# Patient Record
Sex: FEMALE | URBAN - METROPOLITAN AREA
[De-identification: names, ages, dates, MRNs, and addresses within clinical notes are randomized per-mention and may not be internally consistent; named-entity substitution may affect disease eponyms.]

---

## 2021-02-08 ENCOUNTER — OFFICE VISIT (OUTPATIENT)
Dept: URBAN - METROPOLITAN AREA CLINIC 40 | Facility: CLINIC | Age: 61
End: 2021-02-08
Payer: COMMERCIAL

## 2021-02-08 DIAGNOSIS — H52.223 REGULAR ASTIGMATISM, BILATERAL: Primary | ICD-10-CM

## 2021-02-08 PROCEDURE — 92004 COMPRE OPH EXAM NEW PT 1/>: CPT | Performed by: OPTOMETRIST

## 2021-02-08 ASSESSMENT — INTRAOCULAR PRESSURE
OD: 13
OS: 13

## 2021-02-08 ASSESSMENT — VISUAL ACUITY
OD: 20/20
OS: 20/20

## 2021-02-08 ASSESSMENT — KERATOMETRY
OD: 46.00
OS: 45.75

## 2022-07-27 ENCOUNTER — OFFICE VISIT (OUTPATIENT)
Dept: URBAN - METROPOLITAN AREA CLINIC 40 | Facility: CLINIC | Age: 62
End: 2022-07-27
Payer: COMMERCIAL

## 2022-07-27 DIAGNOSIS — H52.4 PRESBYOPIA: ICD-10-CM

## 2022-07-27 DIAGNOSIS — H52.223 REGULAR ASTIGMATISM, BILATERAL: Primary | ICD-10-CM

## 2022-07-27 PROCEDURE — 92014 COMPRE OPH EXAM EST PT 1/>: CPT | Performed by: OPTOMETRIST

## 2022-07-27 ASSESSMENT — VISUAL ACUITY
OS: 20/20
OD: 20/20

## 2022-07-27 ASSESSMENT — INTRAOCULAR PRESSURE
OD: 13
OS: 14

## 2022-07-27 NOTE — IMPRESSION/PLAN
Impression: Regular astigmatism, bilateral: H52.223. Plan: Discussed diagnosis in detail with patient. New glasses Rx was given today. Recommend UV protection. Potential for initial adaptation discussed. Schedule dilation.

## 2024-08-29 ENCOUNTER — OFFICE VISIT (OUTPATIENT)
Dept: URBAN - METROPOLITAN AREA CLINIC 40 | Facility: CLINIC | Age: 64
End: 2024-08-29
Payer: COMMERCIAL

## 2024-08-29 DIAGNOSIS — H52.4 PRESBYOPIA: Primary | ICD-10-CM

## 2024-08-29 PROCEDURE — 92014 COMPRE OPH EXAM EST PT 1/>: CPT | Performed by: OPTOMETRIST

## 2024-08-29 ASSESSMENT — KERATOMETRY
OS: 45.63
OD: 45.88

## 2024-08-29 ASSESSMENT — INTRAOCULAR PRESSURE
OD: 14
OS: 13

## 2024-08-29 ASSESSMENT — VISUAL ACUITY
OD: 20/20
OS: 20/20